# Patient Record
Sex: FEMALE
[De-identification: names, ages, dates, MRNs, and addresses within clinical notes are randomized per-mention and may not be internally consistent; named-entity substitution may affect disease eponyms.]

---

## 2017-02-04 ENCOUNTER — HOSPITAL ENCOUNTER (EMERGENCY)
Dept: HOSPITAL 68 - ERH | Age: 22
LOS: 1 days | End: 2017-02-05
Payer: COMMERCIAL

## 2017-02-04 VITALS — BODY MASS INDEX: 45.99 KG/M2 | HEIGHT: 67 IN | WEIGHT: 293 LBS

## 2017-02-04 DIAGNOSIS — S93.402A: Primary | ICD-10-CM
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
## 2017-02-04 NOTE — ED ANKLE/FOOT INJURY COMPLAINT
History of Present Illness
 
General
Chief Complaint: Foot or Ankle Injury
Stated Complaint: LEFT ANKLE PAIN
Source: patient
Exam Limitations: no limitations
 
Vital Signs & Intake/Output
Vital Signs & Intake/Output
 Vital Signs
 
 
Date Time Temp Pulse Resp B/P Pulse O2 O2 Flow FiO2
 
     Ox Delivery Rate 
 
 0046 98.9 82 18 138/80 98 Room Air  
 
 2248 98.7 84 18 143/80 97 Room Air  
 
 
 ED Intake and Output
 
 
  0000  1200
 
Intake Total 0 
 
Output Total  
 
Balance 0 
 
   
 
Intake, Oral 0 
 
Patient 295 lb 
 
Weight  
 
 
Allergies
Coded Allergies:
cyclobenzaprine (From Flexeril) (Intermediate, DIZZINESS 16)
 
Reconcile Medications
Esomeprazole (Nexium) 40 MG CAPSULE.DR   1 CAP PO DAILY GI  (Reported)
Hyoscyamine Sulfate (Levsin-Sl) 0.125 MG TAB   1-2 TAB SL Q4P PRN abd pain
Ketorolac Tromethamine 10 MG TABLET   1 TAB PO TID PRN PAIN
Meclizine (Antivert) 25 MG PAC   1 TAB PO TID PRN DIZZINESS
Meloxicam (Mobic) 15 MG TABLET   1 TAB PO DAILY PRN PAIN/INFLAMMATION
Ondansetron (Zofran Odt) 4 MG ODT   1 ODT SL Q6P PRN NAUSEA
Tramadol HCl 50 MG TABLET   1 TAB PO Q6 PRN pain
 
Triage Note:
PT TO TRIAGE WITH C/O L ANKLE PAIN 8/10 S/P
 TWISTED R ANKLE LAST NIGHT, +SWELLING AND BRUISING
 TO R ANKLE. ICE PACK PROVIDED, PT MEDICATED WITH
 MOTRIN 400MG PO IN TRIAGE.
Triage Nurses Notes Reviewed? yes
Pregnant: No
Patient currently breastfeeds: No
HPI:
Patient is a 21 year old female presents complaining of left ankle pain s/p 
twisted last night.  Patient everted her ankle last night.  Pain is moderate at 
rest, worse with movement and palpation.  Patient took Tylenol today with no 
improvement.  Denies head injury, neck pain, back pain, numbness, decreased 
range of motion.
(AZUCENA JORGENSEN)
 
Past History
 
Travel History
Traveled to [place] past 21 day No
 
Medical History
Any Pertinent Medical History? see below for history
Neurological: HEADACHES
EENT: NONE
Cardiovascular: NONE
Respiratory: NONE
Gastrointestinal: GERD
Hepatic: NONE
Renal: NONE
Musculoskeletal: NONE
Psychiatric: anxiety, depression
Endocrine: NONE
Blood Disorders: NONE
Cancer(s): NONE
GYN/Reproductive: chlamydia
 
Surgical History
Surgical History: NONE
 
Psychosocial History
What is your primary language English
Tobacco Use: Current Not Daily
 
Family History
Hx Contributory? No
(AZUCENA JORGENSEN)
 
Review of Systems
 
Review of Systems
Constitutional:
Denies: chills, fever. 
EENTM:
Reports: no symptoms. 
Respiratory:
Denies: short of breath. 
Cardiovascular:
Denies: chest pain. 
GI:
Denies: abdominal pain. 
Musculoskeletal:
Reports: see HPI.  Denies: back pain, neck pain. 
Neurological/Psychological:
Denies: headache, numbness, paresthesia. 
Hematologic/Endocrine:
Reports: bruising (mild left ankle).  Denies: bleeding. 
Immunologic/Allergic:
Denies: splenectomy. 
(AZUCENA JORGENSEN)
 
Physical Exam
 
Physical Exam
General Appearance: well developed/nourished, alert, awake
Head: atraumatic, normal appearance
Eyes:
Bilateral: normal appearance. 
Ears, Nose, Throat: hearing grossly normal
Neck: normal inspection, supple, full range of motion
Cardiovascular/Respiratory: no respiratory distress
Back: normal inspection, normal range of motion
Leg/Knee/Thigh Left: normal range of motion, normal inspection
Ankle Left: moderate swelling over the lateral malleolus with mild bruising 
inferior to lateral malleolus.  Mild tenderness medial malleolus.  Full range of
motion.  Joint stable.
Foot Left: normal inspection, normal range of motion, nontender
Neuro/Vascular: normal motor function, normal sensation
Tendon: normal tendon function
(AZUCENA JORGENSEN)
 
Progress
Differential Diagnosis: fracture, dislocation, sprain, contusion
Plan of Care:
 Orders
 
 
Procedure Date/time Status
 
Durable Medical Equipment  0034 Active
 
URINE PREGNANCY 2335 Complete
 
 
 Laboratory Tests
 
 
 
17 2336:
Urine Pregnancy Test NEGATIVE
 
17 2334:
Urine Pregnancy Test Cancelled
2017 12:38:42 AM: Results of x-ray discussed with patient.  Air splint 
placed to the left ankle by nursing staff.  Patient appears stable for 
discharge.  Instructed to follow-up with her primary care provider or with 
orthopedics if no improvement within 2 days.
(AZUCENA JORGENSEN)
Diagnostic Imaging:
Viewed by Me: Radiology Read.  Discussed w/RAD: Radiology Read. 
Radiology Impression: PATIENT: ZOLTAN GUSMAN  MEDICAL RECORD NO: [id number] 
PRESENT AGE: 21  PATIENT ACCOUNT NO: [id number] : 95  LOCATION: [facility] 
ORDERING PHYSICIAN: AZUCENA TIAN     SERVICE DATE:  EXAM 
TYPE: RAD - XRY-ANKLE 3 OR MORE VIEWS L EXAMINATION: XR ANKLE, LEFT CLINICAL 
INFORMATION: Swelling and pain. Left ankle injury. COMPARISON: None TECHNIQUE: 
AP, lateral, and mortise views of the left ankle. FINDINGS: No acute fracture or
dislocation. The ankle mortise is congruent. There is mild circumferential soft 
tissue swelling, most prominent laterally. No ankle joint effusion. IMPRESSION: 
Mild soft tissue swelling. No acute fracture or malalignment. DICTATED BY: 
REHANA ALBERT MD  DATE/TIME DICTATED:17 :
[facility]  DATE/TIME TRANSCRIBED:17 CONFIDENTIAL, DO NOT COPY 
WITHOUT APPROPRIATE AUTHORIZATION.  <Electronically signed in Other Vendor 
System>                                                                         
              SIGNED BY: REHANA ALBERT MD 17
(AZUCENA JORGENSEN)
 
Departure
 
Departure
Time of Disposition: 35
Disposition: HOME OR SELF CARE
Condition: Stable
Clinical Impression
Primary Impression: Left ankle sprain
Qualifiers:  Encounter type: initial encounter Involved ligament of ankle: 
unspecified ligament Qualified Code: S93.402A - Sprain of unspecified ligament 
of left ankle, initial encounter
Referrals:
KATE WU DO (PCP/Family)
 
AGUSTÍN REGALADO MD
 
Additional Instructions:
Rest, ice for 20 minutes 4-5 times a day, elevate, wear her splint for support. 
Follow-up with your primary doctor or with Agustín Regalado MD (orthopedist) if 
no improvement by Monday.  Return to the emergency department if worsening of 
symptoms.
Departure Forms:
Customer Survey
General Discharge Information
Prescriptions:
Current Visit Scripts
Meloxicam (Mobic) 1 TAB PO DAILY PRN PAIN/INFLAMMATION
     #10 TAB 
 
Tramadol HCl 1 TAB PO Q6 PRN pain
     #10 TAB 
 
 
(AZUCENA JORGENSEN)
 
PA/NP Co-Sign Statement
Statement:
ED Attending supervision documentation-
 
[] I saw and evaluated the patient. I have also reviewed all the pertinent lab 
results and diagnostic results. I agree with the findings and the plan of care 
as documented in the PA's/NP's documentation. 
 
x I have reviewed the ED Record and agree with the PA's/NP's documentation.
 
[] Additions or exceptions (if any) to the PAs/NP's note and plan are 
summarized below:
[]
 
(DIANA CONRAD,CAROLINE)

## 2017-02-05 VITALS — SYSTOLIC BLOOD PRESSURE: 138 MMHG | DIASTOLIC BLOOD PRESSURE: 80 MMHG

## 2017-02-05 NOTE — RADIOLOGY REPORT
EXAMINATION:
XR ANKLE, LEFT
 
CLINICAL INFORMATION:
Swelling and pain. Left ankle injury.
 
COMPARISON:
None
 
TECHNIQUE:
AP, lateral, and mortise views of the left ankle.
 
FINDINGS:
No acute fracture or dislocation. The ankle mortise is congruent. There is
mild circumferential soft tissue swelling, most prominent laterally. No ankle
joint effusion.
 
IMPRESSION:
Mild soft tissue swelling. No acute fracture or malalignment.

## 2018-04-17 ENCOUNTER — HOSPITAL ENCOUNTER (EMERGENCY)
Dept: HOSPITAL 68 - ERH | Age: 23
End: 2018-04-17
Payer: COMMERCIAL

## 2018-04-17 VITALS — HEIGHT: 67 IN | WEIGHT: 285 LBS | BODY MASS INDEX: 44.73 KG/M2

## 2018-04-17 VITALS — DIASTOLIC BLOOD PRESSURE: 86 MMHG | SYSTOLIC BLOOD PRESSURE: 134 MMHG
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
## 2018-04-17 DIAGNOSIS — S16.1XXA: Primary | ICD-10-CM
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
## 2018-04-17 DIAGNOSIS — S39.012A: ICD-10-CM
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
## 2018-07-15 ENCOUNTER — HOSPITAL ENCOUNTER (EMERGENCY)
Dept: HOSPITAL 68 - ERH | Age: 23
End: 2018-07-15
Payer: COMMERCIAL

## 2018-07-15 VITALS — SYSTOLIC BLOOD PRESSURE: 115 MMHG | DIASTOLIC BLOOD PRESSURE: 56 MMHG

## 2018-07-15 DIAGNOSIS — M54.9: Primary | ICD-10-CM

## 2018-07-15 DIAGNOSIS — M54.2: ICD-10-CM
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
